# Patient Record
Sex: FEMALE | Race: BLACK OR AFRICAN AMERICAN | NOT HISPANIC OR LATINO | Employment: UNEMPLOYED | URBAN - METROPOLITAN AREA
[De-identification: names, ages, dates, MRNs, and addresses within clinical notes are randomized per-mention and may not be internally consistent; named-entity substitution may affect disease eponyms.]

---
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
## 2022-05-08 ENCOUNTER — HOSPITAL ENCOUNTER (EMERGENCY)
Facility: CLINIC | Age: 22
Discharge: HOME | End: 2022-05-08
Attending: EMERGENCY MEDICINE | Admitting: EMERGENCY MEDICINE
Payer: COMMERCIAL

## 2022-05-08 VITALS
WEIGHT: 140 LBS | HEART RATE: 88 BPM | OXYGEN SATURATION: 100 % | TEMPERATURE: 98.4 F | HEIGHT: 68 IN | DIASTOLIC BLOOD PRESSURE: 78 MMHG | SYSTOLIC BLOOD PRESSURE: 138 MMHG | RESPIRATION RATE: 18 BRPM | BODY MASS INDEX: 21.22 KG/M2

## 2022-05-08 DIAGNOSIS — T74.21XA SEXUAL ASSAULT OF ADULT, INITIAL ENCOUNTER: ICD-10-CM

## 2022-05-08 DIAGNOSIS — R51.9 ACUTE NONINTRACTABLE HEADACHE, UNSPECIFIED HEADACHE TYPE: ICD-10-CM

## 2022-05-08 PROCEDURE — 250N000013 HC RX MED GY IP 250 OP 250 PS 637: Performed by: INTERNAL MEDICINE

## 2022-05-08 PROCEDURE — 250N000013 HC RX MED GY IP 250 OP 250 PS 637: Performed by: EMERGENCY MEDICINE

## 2022-05-08 PROCEDURE — 99284 EMERGENCY DEPT VISIT MOD MDM: CPT | Performed by: EMERGENCY MEDICINE

## 2022-05-08 PROCEDURE — 99285 EMERGENCY DEPT VISIT HI MDM: CPT | Performed by: EMERGENCY MEDICINE

## 2022-05-08 RX ORDER — ACETAMINOPHEN 500 MG
1000 TABLET ORAL ONCE
Status: COMPLETED | OUTPATIENT
Start: 2022-05-08 | End: 2022-05-08

## 2022-05-08 RX ORDER — LEVONORGESTREL 1.5 MG/1
1.5 TABLET ORAL ONCE
Status: COMPLETED | OUTPATIENT
Start: 2022-05-08 | End: 2022-05-08

## 2022-05-08 RX ADMIN — LEVONORGESTREL 1.5 MG: 1.5 TABLET ORAL at 17:44

## 2022-05-08 RX ADMIN — ACETAMINOPHEN 1000 MG: 325 TABLET ORAL at 16:16
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
## 2022-05-08 NOTE — DISCHARGE INSTRUCTIONS
Follow up for STD testing.   Return for any new or worsening symptoms.   Follow up as recommended by the [facility] nurse.
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
## 2022-08-09 ENCOUNTER — VIRTUAL VISIT (OUTPATIENT)
Dept: FAMILY MEDICINE CLINIC | Age: 22
End: 2022-08-09
Payer: COMMERCIAL

## 2022-08-09 DIAGNOSIS — Z11.59 NEED FOR HEPATITIS C SCREENING TEST: ICD-10-CM

## 2022-08-09 DIAGNOSIS — E78.00 ELEVATED LDL CHOLESTEROL LEVEL: Primary | ICD-10-CM

## 2022-08-09 DIAGNOSIS — Z83.438 FAMILY HISTORY OF HYPERLIPIDEMIA: ICD-10-CM

## 2022-08-09 DIAGNOSIS — G44.309 POST-CONCUSSION HEADACHE: ICD-10-CM

## 2022-08-09 PROCEDURE — 99203 OFFICE O/P NEW LOW 30 MIN: CPT | Performed by: NURSE PRACTITIONER
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
## 2024-03-09 ENCOUNTER — TRANSFERRED RECORDS (OUTPATIENT)
Dept: HEALTH INFORMATION MANAGEMENT | Facility: CLINIC | Age: 24
End: 2024-03-09

## 2024-07-09 ENCOUNTER — MEDICAL CORRESPONDENCE (OUTPATIENT)
Dept: HEALTH INFORMATION MANAGEMENT | Facility: CLINIC | Age: 24
End: 2024-07-09
Payer: COMMERCIAL

## 2024-07-10 ENCOUNTER — TRANSFERRED RECORDS (OUTPATIENT)
Dept: HEALTH INFORMATION MANAGEMENT | Facility: CLINIC | Age: 24
End: 2024-07-10
Payer: COMMERCIAL

## 2024-07-10 ENCOUNTER — MEDICAL CORRESPONDENCE (OUTPATIENT)
Dept: HEALTH INFORMATION MANAGEMENT | Facility: CLINIC | Age: 24
End: 2024-07-10
Payer: COMMERCIAL

## 2024-07-15 DIAGNOSIS — R00.0 FAST HEART BEAT: Primary | ICD-10-CM

## 2024-07-18 DIAGNOSIS — R00.0 FAST HEART BEAT: Primary | ICD-10-CM

## 2024-08-30 ENCOUNTER — TELEPHONE (OUTPATIENT)
Dept: CARDIOLOGY | Facility: CLINIC | Age: 24
End: 2024-08-30
Payer: COMMERCIAL

## 2024-08-30 DIAGNOSIS — R00.0 FAST HEART BEAT: Primary | ICD-10-CM

## 2024-08-30 NOTE — TELEPHONE ENCOUNTER
Melvin referral was received for this pt on 7/18/24.  Called pt to review in-person vs mailout option as [place] [facility] [place]  Pt returned call today.  She prefers the mailout option but doesn't want to wear the monitor until November.  New Mailout order placed.  Confirmed [place] mailing address:    [address], [address]  [place] [address]

## 2024-11-01 ENCOUNTER — ORDERS ONLY (AUTO-RELEASED) (OUTPATIENT)
Dept: CARDIOLOGY | Facility: CLINIC | Age: 24
End: 2024-11-01
Payer: COMMERCIAL

## 2024-11-01 DIAGNOSIS — R00.0 FAST HEART BEAT: ICD-10-CM

## 2025-01-08 LAB — CV ZIO PRELIM RESULTS: NORMAL

## 2025-02-02 ENCOUNTER — HEALTH MAINTENANCE LETTER (OUTPATIENT)
Age: 25
End: 2025-02-02

## 2025-02-11 ENCOUNTER — OFFICE VISIT (OUTPATIENT)
Dept: CARDIOLOGY | Facility: CLINIC | Age: 25
End: 2025-02-11
Attending: INTERNAL MEDICINE
Payer: COMMERCIAL

## 2025-02-11 VITALS
SYSTOLIC BLOOD PRESSURE: 122 MMHG | BODY MASS INDEX: 22.2 KG/M2 | WEIGHT: 146 LBS | DIASTOLIC BLOOD PRESSURE: 78 MMHG | HEART RATE: 94 BPM | OXYGEN SATURATION: 100 %

## 2025-02-11 DIAGNOSIS — R00.0 TACHYCARDIA: Primary | ICD-10-CM

## 2025-02-11 PROCEDURE — 99204 OFFICE O/P NEW MOD 45 MIN: CPT | Performed by: INTERNAL MEDICINE

## 2025-02-11 PROCEDURE — 99211 OFF/OP EST MAY X REQ PHY/QHP: CPT | Performed by: INTERNAL MEDICINE

## 2025-02-11 RX ORDER — CALCIUM CARBONATE/VITAMIN D3 600 MG-10
1000 TABLET ORAL DAILY
COMMUNITY

## 2025-02-11 RX ORDER — FERROUS GLUCONATE 324(38)MG
324 TABLET ORAL
COMMUNITY

## 2025-02-11 RX ORDER — NORETHINDRONE ACETATE AND ETHINYL ESTRADIOL .02; 1 MG/1; MG/1
1 TABLET ORAL DAILY
COMMUNITY

## 2025-02-11 ASSESSMENT — PAIN SCALES - GENERAL: PAINLEVEL_OUTOF10: NO PAIN (0)

## 2025-02-11 NOTE — NURSING NOTE
Chief Complaint   Patient presents with    New Patient     new - referral from ? unclear       Vitals were taken, medications reconciled.     Atilio Avila, Clinic Assistant    8:57 AM

## 2025-02-11 NOTE — PROGRESS NOTES
SUBJECTIVE:  Lilliam Orta is a 24 year old female who presents for evaluation of tachycardia.  Patient feels intermittent fast heartbeat.  This is not associated with symptoms.  She also feels dizzy on occasions.  Mainly when she bent down to pet her dog or standing for long time.  Passed out once in the past while he was standing in line for something.  Since then no further syncopal episodes.  Patient is not sure whether her palpitation is associated with dizziness.  She has no significant risk factors.  No excess coffee caffeinated drinks or alcohol.  Non-smoker no significant family history of heart disease.  Patient has no prior cardiac history.  Also states that she is taking birth control pill and it is mentioned on the printout that it can cause dizziness and tachycardia.  Also have some chest pain at rest.  No symptoms during activity.  Patient is a  at [employer].    There are no active problems to display for this patient.   .  Current Outpatient Medications   Medication Sig Dispense Refill    cholecalciferol (VITAMIN D3) 25 mcg (1000 units) capsule Take 1 capsule by mouth daily.      ferrous gluconate (FERGON) 324 (38 Fe) MG tablet Take 324 mg by mouth daily (with breakfast).      Magnesium Glycinate 120 MG CAPS Take by mouth.      norethindrone-ethinyl estradiol (MICROGESTIN 1/20) 1-20 MG-MCG tablet Take 1 tablet by mouth daily.      vitamin B-12 (CYANOCOBALAMIN) 250 MCG tablet Take 1,000 mcg by mouth daily.      dextromethorphan-guaiFENesin (TUSSIN DM)  MG/5ML liquid Take 5 mLs by mouth 4 times daily as needed (cough and congestion) 355 mL 0     No current facility-administered medications for this visit.     No past medical history on file.  No past surgical history on file.  No Known Allergies  Social History     Socioeconomic History    Marital status: Single     Spouse name: Not on file    Number of children: Not on file    Years of education: Not on file    Highest  education level: Not on file   Occupational History    Not on file   Tobacco Use    Smoking status: Never    Smokeless tobacco: Not on file   Substance and Sexual Activity    Alcohol use: Yes    Drug use: Yes     Types: Marijuana    Sexual activity: Yes   Other Topics Concern    Not on file   Social History Narrative    Not on file     Social Drivers of Health     Financial Resource Strain: Not on file   Food Insecurity: Not on file   Transportation Needs: Not on file   Physical Activity: Not on file   Stress: Not on file   Social Connections: Unknown (6/20/2023)    Received from [facility]    Social Connections     Frequency of Communication with Friends and Family: Not on file   Interpersonal Safety: Not on file   Housing Stability: Not on file     No family history on file.       REVIEW OF SYSTEMS:  General: negative, fever, chills, night sweats  Skin: negative, acne, rash, and scaling  Eyes: negative, double vision, eye pain, and photophobia  Ears/Nose/Throat: negative, nasal congestion, and purulent rhinorrhea  Respiratory: No dyspnea on exertion, No cough, No hemoptysis, and negative  Cardiovascular: negative, exertional chest pain or pressure, paroxysmal nocturnal dyspnea, dyspnea on exertion, and orthopnea         OBJECTIVE:  Blood pressure 122/78, pulse 94, weight 66.2 kg (146 lb), SpO2 100%.  General Appearance: healthy, alert, active, and no distress  Head: Normocephalic. No masses, lesions, tenderness or abnormalities  Eyes: conjuctiva clear, PERRL, EOM intact  Ears: External ears normal. Canals clear. TM's normal.  Nose: Nares normal  Mouth: normal  Neck: Supple, no cervical adenopathy, no thyromegaly  Lungs: clear to auscultation  Cardiac: regular rate and rhythm, normal S1 and S2, no murmur       ASSESSMENT/PLAN:  Patient here for evaluation of tachycardia and intermittent dizziness.  Feels heart racing intermittently.  Also have some dizziness when she stands up  suddenly or bending down and then stand up or standing for long time.  1 episode of syncope in the past she was standing in line for some store.  Her dizziness and palpitations are not associated.  She has no significant risk factors.  No excess coffee caffeinated drinks or alcohol.  No significant family history of heart disease.  Patient's EKG reviewed normal sinus rhythm normal EKG.  Patient Zio patch result reviewed and is as below.    Patient had a min HR of 32 bpm, max HR of 167 bpm, and avg HR of 75 bpm. Predominant underlying rhythm was Sinus Rhythm. Second Degree AV Block-Mobitz I (Wenckebach) was present. Isolated SVEs were rare (<1.0%), and no SVE Couplets or SVE Triplets were present. Isolated VEs were rare (<1.0%), and no VE Couplets or VE Triplets were present.    No significant arrhythmia noted.  5 patient triggered events corresponded to sinus tachycardia with a rate between 101 120 bpm.  2 episodes of Wenckebach block noted at 6 AM and 7 AM.  Patient usually gets up around 8:30 in the morning.  This is a benign finding.  Result was discussed with patient.  Discussed benign nature of arrhythmia.  No additional medication is needed at this time.  Will get an echocardiogram as baseline.  Patient will be contacted with the test results.  For intermittent postural dizziness patient is advised to hydrate herself well and advised to increase her salt and water intake.  No regular follow-up has been arranged.  Total visit duration 45 minutes.  This included face-to-face interview, physical exam, chart review, review of EKG Zio patch and documentation.

## 2025-02-11 NOTE — PATIENT INSTRUCTIONS
Complete an echocardiogram (ultrasound of heart)    As soon as results are compiled and reviewed, you will be notified.  Follow up based on results.

## 2025-02-11 NOTE — LETTER
2/11/2025      RE: Lilliam Orta  [address] [facility] [id number]       Dear Colleague,    Thank you for the opportunity to participate in the care of your patient, Lilliam Orta, at the [facility] [place] at [facility]. Please see a copy of my visit note below.       SUBJECTIVE:  Lilliam Orta is a 24 year old female who presents for evaluation of tachycardia.  Patient feels intermittent fast heartbeat.  This is not associated with symptoms.  She also feels dizzy on occasions.  Mainly when she bent down to pet her dog or standing for long time.  Passed out once in the past while he was standing in line for something.  Since then no further syncopal episodes.  Patient is not sure whether her palpitation is associated with dizziness.  She has no significant risk factors.  No excess coffee caffeinated drinks or alcohol.  Non-smoker no significant family history of heart disease.  Patient has no prior cardiac history.  Also states that she is taking birth control pill and it is mentioned on the printout that it can cause dizziness and tachycardia.  Also have some chest pain at rest.  No symptoms during activity.  Patient is a  at [employer].    There are no active problems to display for this patient.   .  Current Outpatient Medications   Medication Sig Dispense Refill     cholecalciferol (VITAMIN D3) 25 mcg (1000 units) capsule Take 1 capsule by mouth daily.       ferrous gluconate (FERGON) 324 (38 Fe) MG tablet Take 324 mg by mouth daily (with breakfast).       Magnesium Glycinate 120 MG CAPS Take by mouth.       norethindrone-ethinyl estradiol (MICROGESTIN 1/20) 1-20 MG-MCG tablet Take 1 tablet by mouth daily.       vitamin B-12 (CYANOCOBALAMIN) 250 MCG tablet Take 1,000 mcg by mouth daily.       dextromethorphan-guaiFENesin (TUSSIN DM)  MG/5ML liquid Take 5 mLs by mouth 4 times daily as needed (cough and  congestion) 355 mL 0     No current facility-administered medications for this visit.     No past medical history on file.  No past surgical history on file.  No Known Allergies  Social History     Socioeconomic History     Marital status: Single     Spouse name: Not on file     Number of children: Not on file     Years of education: Not on file     Highest education level: Not on file   Occupational History     Not on file   Tobacco Use     Smoking status: Never     Smokeless tobacco: Not on file   Substance and Sexual Activity     Alcohol use: Yes     Drug use: Yes     Types: Marijuana     Sexual activity: Yes   Other Topics Concern     Not on file   Social History Narrative     Not on file     Social Drivers of Health     Financial Resource Strain: Not on file   Food Insecurity: Not on file   Transportation Needs: Not on file   Physical Activity: Not on file   Stress: Not on file   Social Connections: Unknown (6/20/2023)    Received from [employer] [facility]    Social Connections      Frequency of Communication with Friends and Family: Not on file   Interpersonal Safety: Not on file   Housing Stability: Not on file     No family history on file.       REVIEW OF SYSTEMS:  General: negative, fever, chills, night sweats  Skin: negative, acne, rash, and scaling  Eyes: negative, double vision, eye pain, and photophobia  Ears/Nose/Throat: negative, nasal congestion, and purulent rhinorrhea  Respiratory: No dyspnea on exertion, No cough, No hemoptysis, and negative  Cardiovascular: negative, exertional chest pain or pressure, paroxysmal nocturnal dyspnea, dyspnea on exertion, and orthopnea         OBJECTIVE:  Blood pressure 122/78, pulse 94, weight 66.2 kg (146 lb), SpO2 100%.  General Appearance: healthy, alert, active, and no distress  Head: Normocephalic. No masses, lesions, tenderness or abnormalities  Eyes: conjuctiva clear, PERRL, EOM intact  Ears: External ears normal. Canals clear. TM's  normal.  Nose: Nares normal  Mouth: normal  Neck: Supple, no cervical adenopathy, no thyromegaly  Lungs: clear to auscultation  Cardiac: regular rate and rhythm, normal S1 and S2, no murmur       ASSESSMENT/PLAN:  Patient here for evaluation of tachycardia and intermittent dizziness.  Feels heart racing intermittently.  Also have some dizziness when she stands up suddenly or bending down and then stand up or standing for long time.  1 episode of syncope in the past she was standing in line for some store.  Her dizziness and palpitations are not associated.  She has no significant risk factors.  No excess coffee caffeinated drinks or alcohol.  No significant family history of heart disease.  Patient's EKG reviewed normal sinus rhythm normal EKG.  Patient Zio patch result reviewed and is as below.    Patient had a min HR of 32 bpm, max HR of 167 bpm, and avg HR of 75 bpm. Predominant underlying rhythm was Sinus Rhythm. Second Degree AV Block-Mobitz I (Wenckebach) was present. Isolated SVEs were rare (<1.0%), and no SVE Couplets or SVE Triplets were present. Isolated VEs were rare (<1.0%), and no VE Couplets or VE Triplets were present.    No significant arrhythmia noted.  5 patient triggered events corresponded to sinus tachycardia with a rate between 101 120 bpm.  2 episodes of Wenckebach block noted at 6 AM and 7 AM.  Patient usually gets up around 8:30 in the morning.  This is a benign finding.  Result was discussed with patient.  Discussed benign nature of arrhythmia.  No additional medication is needed at this time.  Will get an echocardiogram as baseline.  Patient will be contacted with the test results.  For intermittent postural dizziness patient is advised to hydrate herself well and advised to increase her salt and water intake.  No regular follow-up has been arranged.  Total visit duration 45 minutes.  This included face-to-face interview, physical exam, chart review, review of EKG Zio patch and  documentation.      Please do not hesitate to contact me if you have any questions/concerns.     Sincerely,     ASHISH Estrella MD

## 2025-04-04 ENCOUNTER — ANCILLARY PROCEDURE (OUTPATIENT)
Dept: CARDIOLOGY | Facility: CLINIC | Age: 25
End: 2025-04-04
Attending: INTERNAL MEDICINE
Payer: COMMERCIAL

## 2025-04-04 DIAGNOSIS — R00.0 TACHYCARDIA: ICD-10-CM

## 2025-04-04 LAB — LVEF ECHO: NORMAL

## 2025-04-04 PROCEDURE — 93306 TTE W/DOPPLER COMPLETE: CPT | Performed by: STUDENT IN AN ORGANIZED HEALTH CARE EDUCATION/TRAINING PROGRAM
